# Patient Record
Sex: FEMALE | Race: WHITE | NOT HISPANIC OR LATINO | Employment: OTHER | URBAN - METROPOLITAN AREA
[De-identification: names, ages, dates, MRNs, and addresses within clinical notes are randomized per-mention and may not be internally consistent; named-entity substitution may affect disease eponyms.]

---

## 2017-07-25 ENCOUNTER — HOSPITAL ENCOUNTER (OUTPATIENT)
Dept: MAMMOGRAPHY | Facility: CLINIC | Age: 77
Discharge: HOME | End: 2017-07-25
Attending: OBSTETRICS & GYNECOLOGY | Admitting: OBSTETRICS & GYNECOLOGY
Payer: MEDICARE

## 2017-07-25 DIAGNOSIS — Z12.31 VISIT FOR SCREENING MAMMOGRAM: ICD-10-CM

## 2017-07-25 PROCEDURE — 77063 BREAST TOMOSYNTHESIS BI: CPT

## 2017-07-25 PROCEDURE — G0202 SCR MAMMO BI INCL CAD: HCPCS

## 2019-08-15 ENCOUNTER — HOSPITAL ENCOUNTER (OUTPATIENT)
Dept: MAMMOGRAPHY | Facility: CLINIC | Age: 79
Discharge: HOME | End: 2019-08-15
Attending: OBSTETRICS & GYNECOLOGY | Admitting: OBSTETRICS & GYNECOLOGY
Payer: MEDICARE

## 2019-08-15 DIAGNOSIS — Z12.31 VISIT FOR SCREENING MAMMOGRAM: ICD-10-CM

## 2019-08-15 PROCEDURE — 77063 BREAST TOMOSYNTHESIS BI: CPT

## 2021-04-29 LAB
ALT SERPL-CCNC: 23 U/L (ref 14–59)
AST SERPL-CCNC: 18 U/L (ref 15–37)
CREATININE (EXTERNAL): 0.9 MG/DL (ref 0.55–1.02)
GFR ESTIMATED (EXTERNAL): 61 ML/MIN/1.73_M2
GLUCOSE (EXTERNAL): 87 MG/DL (ref 70–100)
POTASSIUM (EXTERNAL): 3.5 MMOL/L (ref 3.6–5.2)

## 2021-05-06 LAB
ALT SERPL-CCNC: 23 U/L (ref 14–59)
AST SERPL-CCNC: 18 U/L (ref 15–37)
CREATININE (EXTERNAL): 0.83 MG/DL (ref 0.55–1.02)
GFR ESTIMATED (EXTERNAL): 68 ML/MIN/1.73_M2
GLUCOSE (EXTERNAL): 121 MG/DL (ref 70–100)
POTASSIUM (EXTERNAL): 3.6 MMOL/L (ref 3.6–5.2)

## 2021-06-03 LAB
ALT SERPL-CCNC: 24 U/L (ref 14–59)
AST SERPL-CCNC: 16 U/L (ref 15–37)
CREATININE (EXTERNAL): 0.83 MG/DL (ref 0.55–1.02)
GFR ESTIMATED (EXTERNAL): 68 ML/MIN/1.73_M2
GLUCOSE (EXTERNAL): 105 MG/DL (ref 70–100)
POTASSIUM (EXTERNAL): 3.4 MMOL/L (ref 3.6–5.2)

## 2021-06-29 ENCOUNTER — TRANSFERRED RECORDS (OUTPATIENT)
Dept: HEALTH INFORMATION MANAGEMENT | Facility: CLINIC | Age: 81
End: 2021-06-29
Payer: MEDICARE

## 2021-06-29 LAB
ALT SERPL-CCNC: 25 U/L (ref 14–59)
AST SERPL-CCNC: 16 U/L (ref 15–37)
CREATININE (EXTERNAL): 0.91 MG/DL (ref 0.55–1.02)
GFR ESTIMATED (EXTERNAL): 60 ML/MIN/1.73_M2
GLUCOSE (EXTERNAL): 100 MG/DL (ref 70–100)
POTASSIUM (EXTERNAL): 3.7 MMOL/L (ref 3.6–5.2)

## 2021-07-08 ENCOUNTER — TRANSFERRED RECORDS (OUTPATIENT)
Dept: HEALTH INFORMATION MANAGEMENT | Facility: CLINIC | Age: 81
End: 2021-07-08
Payer: MEDICARE

## 2021-07-08 LAB
ALT SERPL-CCNC: 24 U/L (ref 14–59)
AST SERPL-CCNC: 16 U/L (ref 15–37)
CREATININE (EXTERNAL): 0.78 MG/DL (ref 0.55–1.02)
GFR ESTIMATED (EXTERNAL): 73 ML/MIN/1.73_M2
GLUCOSE (EXTERNAL): 113 MG/DL (ref 70–100)
POTASSIUM (EXTERNAL): 3.8 MMOL/L (ref 3.6–5.2)

## 2021-07-15 ENCOUNTER — HOSPITAL ENCOUNTER (OUTPATIENT)
Dept: MAMMOGRAPHY | Facility: CLINIC | Age: 81
Discharge: HOME | End: 2021-07-15
Attending: FAMILY MEDICINE | Admitting: FAMILY MEDICINE
Payer: MEDICARE

## 2021-07-15 DIAGNOSIS — Z12.31 VISIT FOR SCREENING MAMMOGRAM: ICD-10-CM

## 2021-07-15 PROCEDURE — 77063 BREAST TOMOSYNTHESIS BI: CPT

## 2021-08-05 LAB
ALT SERPL-CCNC: 23 U/L (ref 14–59)
AST SERPL-CCNC: 17 U/L (ref 15–37)
CREATININE (EXTERNAL): 0.86 MG/DL (ref 0.55–1.02)
GFR ESTIMATED (EXTERNAL): 65 ML/MIN/1.73_M2
GLUCOSE (EXTERNAL): 81 MG/DL (ref 70–100)
POTASSIUM (EXTERNAL): 4 MMOL/L (ref 3.6–5.2)

## 2021-09-02 LAB
ALT SERPL-CCNC: 29 U/L (ref 14–59)
AST SERPL-CCNC: 18 U/L (ref 15–37)
CREATININE (EXTERNAL): 0.75 MG/DL (ref 0.55–1.02)
GFR ESTIMATED (EXTERNAL): 76 ML/MIN/1.73_M2
GLUCOSE (EXTERNAL): 99 MG/DL (ref 70–100)
POTASSIUM (EXTERNAL): 3.7 MMOL/L (ref 3.6–5.2)

## 2021-09-30 ENCOUNTER — TRANSFERRED RECORDS (OUTPATIENT)
Dept: HEALTH INFORMATION MANAGEMENT | Facility: CLINIC | Age: 81
End: 2021-09-30
Payer: MEDICARE

## 2021-10-01 ENCOUNTER — TRANSFERRED RECORDS (OUTPATIENT)
Dept: HEALTH INFORMATION MANAGEMENT | Facility: CLINIC | Age: 81
End: 2021-10-01
Payer: MEDICARE

## 2021-10-01 LAB
ALT SERPL-CCNC: 22 U/L (ref 14–59)
AST SERPL-CCNC: 16 U/L (ref 15–37)
CREATININE (EXTERNAL): 0.77 MG/DL (ref 0.55–1.02)
GFR SERPL CREATININE-BSD FRML MDRD: 74 ML/MIN/1.73_M2
GLUCOSE (EXTERNAL): 96 MG/DL (ref 70–100)
POTASSIUM (EXTERNAL): 3.8 MMOL/L (ref 3.6–5.2)

## 2022-04-29 ENCOUNTER — TRANSFERRED RECORDS (OUTPATIENT)
Dept: HEALTH INFORMATION MANAGEMENT | Facility: CLINIC | Age: 82
End: 2022-04-29
Payer: MEDICARE

## 2022-04-29 LAB
ALT SERPL-CCNC: 12 U/L (ref 5–33)
AST SERPL-CCNC: 13 U/L (ref 5–32)
CREATININE (EXTERNAL): 0.7 MG/DL (ref 0.51–0.95)
GFR ESTIMATED (EXTERNAL): 82 ML/MIN/1.73_M2
GLUCOSE (EXTERNAL): 113 MG/DL (ref 82–115)
POTASSIUM (EXTERNAL): 3.6 MMOL/L (ref 3.4–5.1)

## 2022-06-16 ENCOUNTER — OFFICE VISIT (OUTPATIENT)
Dept: CARDIOLOGY | Facility: CLINIC | Age: 82
End: 2022-06-16
Payer: MEDICARE

## 2022-06-16 VITALS
WEIGHT: 136.9 LBS | HEART RATE: 80 BPM | BODY MASS INDEX: 23.37 KG/M2 | OXYGEN SATURATION: 99 % | HEIGHT: 64 IN | SYSTOLIC BLOOD PRESSURE: 138 MMHG | DIASTOLIC BLOOD PRESSURE: 60 MMHG

## 2022-06-16 DIAGNOSIS — R01.1 HEART MURMUR: ICD-10-CM

## 2022-06-16 DIAGNOSIS — I10 HYPERTENSION, UNSPECIFIED TYPE: ICD-10-CM

## 2022-06-16 DIAGNOSIS — E78.5 HYPERLIPIDEMIA LDL GOAL <100: ICD-10-CM

## 2022-06-16 DIAGNOSIS — Z82.49 FAMILY HISTORY OF ISCHEMIC HEART DISEASE: ICD-10-CM

## 2022-06-16 DIAGNOSIS — Z13.6 CARDIOVASCULAR SCREENING; LDL GOAL LESS THAN 100: Primary | ICD-10-CM

## 2022-06-16 DIAGNOSIS — R09.89 BILATERAL CAROTID BRUITS: ICD-10-CM

## 2022-06-16 PROCEDURE — 99204 OFFICE O/P NEW MOD 45 MIN: CPT | Performed by: INTERNAL MEDICINE

## 2022-06-16 PROCEDURE — 93000 ELECTROCARDIOGRAM COMPLETE: CPT | Performed by: INTERNAL MEDICINE

## 2022-06-16 RX ORDER — ENALAPRIL MALEATE 20 MG/1
20 TABLET ORAL 2 TIMES DAILY
COMMUNITY
Start: 2022-04-03

## 2022-06-16 RX ORDER — AMLODIPINE BESYLATE 10 MG/1
10 TABLET ORAL DAILY
COMMUNITY
Start: 2022-04-03

## 2022-06-16 RX ORDER — MELOXICAM 7.5 MG/1
7.5 TABLET ORAL DAILY
COMMUNITY
Start: 2022-05-20

## 2022-06-16 RX ORDER — ATORVASTATIN CALCIUM 10 MG/1
10 TABLET, FILM COATED ORAL DAILY
COMMUNITY
Start: 2022-04-03

## 2022-06-16 NOTE — LETTER
6/16/2022    Jorge Ram MD  [facility] [address]  [facility] [address]    RE: Jaki Veloz       Dear Colleague,     I had the pleasure of seeing Jaki Veloz in the [facility].  HPI and Plan:   See dictation    Orders Placed This Encounter   Procedures     US Carotid Bilateral     Follow-Up with Cardiology     EKG 12-lead complete w/read - Clinics (performed today)     Echocardiogram Complete       Orders Placed This Encounter   Medications     enalapril (VASOTEC) 20 MG tablet     Sig: Take 20 mg by mouth 2 times daily     amLODIPine (NORVASC) 10 MG tablet     Sig: Take 10 mg by mouth daily     atorvastatin (LIPITOR) 10 MG tablet     Sig: Take 10 mg by mouth daily     meloxicam (MOBIC) 7.5 MG tablet     Sig: Take 7.5 mg by mouth daily     Multiple Vitamins-Minerals (CENTRUM SILVER 50+WOMEN PO)     Calcium Carb-Cholecalciferol (CALCIUM 500 + D) 500-125 MG-UNIT TABS     Coenzyme Q10 (CO Q 10) 100 MG CAPS       There are no discontinued medications.      Encounter Diagnoses   Name Primary?     CARDIOVASCULAR SCREENING; LDL GOAL LESS THAN 100 Yes     Hypertension, unspecified type      Hyperlipidemia LDL goal <100      Family history of ischemic heart disease      Bilateral carotid bruits      Heart murmur        CURRENT MEDICATIONS:  Current Outpatient Medications   Medication Sig Dispense Refill     amLODIPine (NORVASC) 10 MG tablet Take 10 mg by mouth daily       atorvastatin (LIPITOR) 10 MG tablet Take 10 mg by mouth daily       Calcium Carb-Cholecalciferol (CALCIUM 500 + D) 500-125 MG-UNIT TABS        Coenzyme Q10 (CO Q 10) 100 MG CAPS        enalapril (VASOTEC) 20 MG tablet Take 20 mg by mouth 2 times daily       meloxicam (MOBIC) 7.5 MG tablet Take 7.5 mg by mouth daily       Multiple Vitamins-Minerals (CENTRUM SILVER 50+WOMEN PO)          ALLERGIES     Allergies   Allergen Reactions     Penicillins        PAST MEDICAL HISTORY:  History reviewed. No pertinent past  "medical history.    PAST SURGICAL HISTORY:  History reviewed. No pertinent surgical history.    FAMILY HISTORY:  History reviewed. No pertinent family history.    SOCIAL HISTORY:  Social History     Socioeconomic History     Marital status:      Spouse name: None     Number of children: None     Years of education: None     Highest education level: None   Tobacco Use     Smoking status: Never Smoker     Smokeless tobacco: Never Used   Substance and Sexual Activity     Drug use: Never       Review of Systems:  Skin:  Negative       Eyes:  Negative      ENT:  Negative      Respiratory:  Negative       Cardiovascular:  Negative      Gastroenterology: Negative      Genitourinary:  Negative      Musculoskeletal:  Negative      Neurologic:         Psychiatric:  Negative      Heme/Lymph/Imm:  Negative      Endocrine:  Negative        Physical Exam:  Vitals: /60 (BP Location: Right arm, Patient Position: Sitting, Cuff Size: Adult Regular)   Pulse 80   Ht 1.626 m (5' 4\")   Wt 62.1 kg (136 lb 14.4 oz)   LMP  (LMP Unknown)   SpO2 99%   Breastfeeding No   BMI 23.50 kg/m      Constitutional:  cooperative;in no acute distress        Skin:  warm and dry to the touch          Head:  normocephalic        Eyes:  pupils equal and round        Lymph:      ENT:  no pallor or cyanosis        Neck:    bilateral carotid bruit      Respiratory:  clear to auscultation;normal symmetry         Cardiac: regular rhythm       systolic ejection murmur;RUSB        pulses full and equal                                        GI:  abdomen soft;no bruits        Extremities and Muscular Skeletal:  no deformities, clubbing, cyanosis, erythema observed;no edema              Neurological:  no gross motor deficits;affect appropriate        Psych:  Alert and Oriented x 3          CC  Referred Self,    Thank you for allowing me to participate in the care of your patient.      Sincerely,     Fabiola Baxter, [facility] " [facility]

## 2022-06-16 NOTE — PROGRESS NOTES
HPI and Plan:   See dictation    Orders Placed This Encounter   Procedures     US Carotid Bilateral     Follow-Up with Cardiology     EKG 12-lead complete w/read - Clinics (performed today)     Echocardiogram Complete       Orders Placed This Encounter   Medications     enalapril (VASOTEC) 20 MG tablet     Sig: Take 20 mg by mouth 2 times daily     amLODIPine (NORVASC) 10 MG tablet     Sig: Take 10 mg by mouth daily     atorvastatin (LIPITOR) 10 MG tablet     Sig: Take 10 mg by mouth daily     meloxicam (MOBIC) 7.5 MG tablet     Sig: Take 7.5 mg by mouth daily     Multiple Vitamins-Minerals (CENTRUM SILVER 50+WOMEN PO)     Calcium Carb-Cholecalciferol (CALCIUM 500 + D) 500-125 MG-UNIT TABS     Coenzyme Q10 (CO Q 10) 100 MG CAPS       There are no discontinued medications.      Encounter Diagnoses   Name Primary?     CARDIOVASCULAR SCREENING; LDL GOAL LESS THAN 100 Yes     Hypertension, unspecified type      Hyperlipidemia LDL goal <100      Family history of ischemic heart disease      Bilateral carotid bruits      Heart murmur        CURRENT MEDICATIONS:  Current Outpatient Medications   Medication Sig Dispense Refill     amLODIPine (NORVASC) 10 MG tablet Take 10 mg by mouth daily       atorvastatin (LIPITOR) 10 MG tablet Take 10 mg by mouth daily       Calcium Carb-Cholecalciferol (CALCIUM 500 + D) 500-125 MG-UNIT TABS        Coenzyme Q10 (CO Q 10) 100 MG CAPS        enalapril (VASOTEC) 20 MG tablet Take 20 mg by mouth 2 times daily       meloxicam (MOBIC) 7.5 MG tablet Take 7.5 mg by mouth daily       Multiple Vitamins-Minerals (CENTRUM SILVER 50+WOMEN PO)          ALLERGIES     Allergies   Allergen Reactions     Penicillins        PAST MEDICAL HISTORY:  History reviewed. No pertinent past medical history.    PAST SURGICAL HISTORY:  History reviewed. No pertinent surgical history.    FAMILY HISTORY:  History reviewed. No pertinent family history.    SOCIAL HISTORY:  Social History     Socioeconomic History      "Marital status:      Spouse name: None     Number of children: None     Years of education: None     Highest education level: None   Tobacco Use     Smoking status: Never Smoker     Smokeless tobacco: Never Used   Substance and Sexual Activity     Drug use: Never       Review of Systems:  Skin:  Negative       Eyes:  Negative      ENT:  Negative      Respiratory:  Negative       Cardiovascular:  Negative      Gastroenterology: Negative      Genitourinary:  Negative      Musculoskeletal:  Negative      Neurologic:         Psychiatric:  Negative      Heme/Lymph/Imm:  Negative      Endocrine:  Negative        Physical Exam:  Vitals: /60 (BP Location: Right arm, Patient Position: Sitting, Cuff Size: Adult Regular)   Pulse 80   Ht 1.626 m (5' 4\")   Wt 62.1 kg (136 lb 14.4 oz)   LMP  (LMP Unknown)   SpO2 99%   Breastfeeding No   BMI 23.50 kg/m      Constitutional:  cooperative;in no acute distress        Skin:  warm and dry to the touch          Head:  normocephalic        Eyes:  pupils equal and round        Lymph:      ENT:  no pallor or cyanosis        Neck:    bilateral carotid bruit      Respiratory:  clear to auscultation;normal symmetry         Cardiac: regular rhythm       systolic ejection murmur;RUSB        pulses full and equal                                        GI:  abdomen soft;no bruits        Extremities and Muscular Skeletal:  no deformities, clubbing, cyanosis, erythema observed;no edema              Neurological:  no gross motor deficits;affect appropriate        Psych:  Alert and Oriented x 3          CC  Referred Self, MD  No address on file                  "

## 2022-06-16 NOTE — PROGRESS NOTES
Service Date: 06/16/2022    REFERRING PHYSICIAN:  Jorge Ram MD    HISTORY OF PRESENT ILLNESS:  The patient is referred for preventative cardiac cares.  She is 81 years old and has a family history of heart disease, so she wanted to come in to be proactive about any possible future heart related issues.  She is not having any symptoms, specifically denies any chest pain or difficulty breathing.  She is a very healthy, vivacious, active 81-year-old.  She actually lives in [place], [place] but spends quite a bit of the summer up here in [place] with her family.  She is on blood pressure medication and cholesterol lowering medication.  She takes meloxicam for arthritis.  We did perform an electrocardiogram in office today, which I reviewed.  This shows a normal sinus rhythm, normal axis.  She has just a slight T-wave abnormality in V2, but otherwise, this is a normal EKG.  I do not have one available for comparison.    PHYSICAL EXAMINATION:  Her blood pressure today was 138/60, pulse of 80, weight is 136 with a body mass index of 23.  She has bilateral carotid bruits, right greater than left.  Cardiovascular tones are regular suggesting a normal sinus rhythm but with a loud systolic ejection murmur best heard at the right upper sternal border.  Her lung fields are clear.  She has strong pulses in the distal extremities without peripheral edema.    In summary, Ms. Veloz is a very pleasant 81-year-old female with underlying history of hypertension, hyperlipidemia and family history of coronary disease.  She is presenting for preventative cares with concerns about her risk for future cardiovascular events.  On exam, I have diagnosed her with a heart murmur and bilateral carotid artery bruits.  This could be radiation as the murmur does suggest aortic stenosis.  However, she does have risk factors for peripheral arterial disease, so I will recommend that she undergo both an echocardiogram to assess the heart  murmur and carotid ultrasound.  She is agreeable to this.  As far as determining her risk for future cardiovascular events, given that she is asymptomatic, I felt a CT calcium score may be helpful.  I explained this test to her in detail, also that it likely will not be covered by insurance, and she is agreeable to proceed with this as well, so I ordered a CT calcium score.  She is currently living in [place], and therefore, we will try to schedule all these tests at the same time followed by a followup visit so we can go over the test results.    Please feel free to contact me with any questions you have in regards to her care.      Fabiola Baxter DO        D: 2022   T: 2022   MT: shantel    Name:     LUCI WHEELER  MRN:      [id number]        Account:      [id number]   :      1940           Service Date: 2022       Document: [id number]

## 2022-07-08 ENCOUNTER — TELEPHONE (OUTPATIENT)
Dept: CARDIOLOGY | Facility: CLINIC | Age: 82
End: 2022-07-08

## 2022-07-08 ENCOUNTER — HOSPITAL ENCOUNTER (OUTPATIENT)
Dept: CARDIOLOGY | Facility: CLINIC | Age: 82
Discharge: HOME | End: 2022-07-08
Attending: INTERNAL MEDICINE
Payer: MEDICARE

## 2022-07-08 ENCOUNTER — HOSPITAL ENCOUNTER (OUTPATIENT)
Dept: CARDIOLOGY | Facility: CLINIC | Age: 82
Discharge: HOME | End: 2022-07-08
Attending: INTERNAL MEDICINE | Admitting: INTERNAL MEDICINE

## 2022-07-08 ENCOUNTER — HOSPITAL ENCOUNTER (OUTPATIENT)
Dept: ULTRASOUND IMAGING | Facility: CLINIC | Age: 82
Discharge: HOME | End: 2022-07-08
Attending: INTERNAL MEDICINE
Payer: MEDICARE

## 2022-07-08 DIAGNOSIS — I10 HYPERTENSION, UNSPECIFIED TYPE: ICD-10-CM

## 2022-07-08 DIAGNOSIS — E78.5 HYPERLIPIDEMIA LDL GOAL <100: ICD-10-CM

## 2022-07-08 DIAGNOSIS — Z82.49 FAMILY HISTORY OF ISCHEMIC HEART DISEASE: ICD-10-CM

## 2022-07-08 DIAGNOSIS — R09.89 BILATERAL CAROTID BRUITS: ICD-10-CM

## 2022-07-08 DIAGNOSIS — Z13.6 CARDIOVASCULAR SCREENING; LDL GOAL LESS THAN 100: ICD-10-CM

## 2022-07-08 DIAGNOSIS — R01.1 HEART MURMUR: ICD-10-CM

## 2022-07-08 DIAGNOSIS — R94.30 ABNORMAL CARDIAC FUNCTION TEST: Primary | ICD-10-CM

## 2022-07-08 LAB
BI-PLANE LVEF ECHO: NORMAL
LVEF ECHO: NORMAL

## 2022-07-08 PROCEDURE — 75571 CT HRT W/O DYE W/CA TEST: CPT

## 2022-07-08 PROCEDURE — 93306 TTE W/DOPPLER COMPLETE: CPT | Mod: 26 | Performed by: INTERNAL MEDICINE

## 2022-07-08 PROCEDURE — 93880 EXTRACRANIAL BILAT STUDY: CPT

## 2022-07-08 PROCEDURE — 93306 TTE W/DOPPLER COMPLETE: CPT

## 2022-07-08 PROCEDURE — 93880 EXTRACRANIAL BILAT STUDY: CPT | Mod: 26 | Performed by: INTERNAL MEDICINE

## 2022-07-08 PROCEDURE — 75571 CT HRT W/O DYE W/CA TEST: CPT | Mod: 26 | Performed by: INTERNAL MEDICINE

## 2022-07-08 NOTE — TELEPHONE ENCOUNTER
Results noted. Dr. Baxter out of the office. RN will send to her partner to review and advise if ok to wait for her return or if sooner recommendations.                 7/8/22 CT coronary calcium results  CORONARY ARTERY CALCIUM SCORES:      Left main coronary artery: 0  Left anterior descending coronary artery: 621  Circumflex coronary artery: 28.8  Right coronary artery: 116     TOTAL CALCIUM SCORE: 766         6/16/22 visit Dr. Baxter  In summary, Ms. Veloz is a very pleasant 81-year-old female with underlying history of hypertension, hyperlipidemia and family history of coronary disease.  She is presenting for preventative cares with concerns about her risk for future cardiovascular events.  On exam, I have diagnosed her with a heart murmur and bilateral carotid artery bruits.  This could be radiation as the murmur does suggest aortic stenosis.  However, she does have risk factors for peripheral arterial disease, so I will recommend that she undergo both an echocardiogram to assess the heart murmur and carotid ultrasound.  She is agreeable to this.  As far as determining her risk for future cardiovascular events, given that she is asymptomatic, I felt a CT calcium score may be helpful.  I explained this test to her in detail, also that it likely will not be covered by insurance, and she is agreeable to proceed with this as well, so I ordered a CT calcium score.  She is currently living in [place], and therefore, we will try to schedule all these tests at the same time followed by a followup visit so we can go over the test results.     Please feel free to contact me with any questions you have in regards to her care.        Fabiola Baxter, DO

## 2022-07-08 NOTE — TELEPHONE ENCOUNTER
Cyrus    It looks like she is asymptomatic per Dr. Baxter's note. I would wait for her to review this, since some of us would refer for stress testing but others may not. She can also go over the medication recommendations at that time.    Thanks.

## 2022-07-15 NOTE — TELEPHONE ENCOUNTER
RN called patient and reviewed with her Dr. Baxter's recommendations. Patient verbalized understanding and is in agreement with plan. RN transferred patient to scheduling to arrange labs and stress test.       I would order an exercise TM stress test and lipid profile with alt      Dr. Baxter

## 2022-07-28 ENCOUNTER — HOSPITAL ENCOUNTER (OUTPATIENT)
Dept: CARDIOLOGY | Facility: CLINIC | Age: 82
Discharge: HOME | End: 2022-07-28
Attending: INTERNAL MEDICINE | Admitting: INTERNAL MEDICINE
Payer: MEDICARE

## 2022-07-28 ENCOUNTER — LAB (OUTPATIENT)
Dept: LAB | Facility: CLINIC | Age: 82
End: 2022-07-28
Payer: MEDICARE

## 2022-07-28 DIAGNOSIS — E78.5 HYPERLIPIDEMIA LDL GOAL <100: ICD-10-CM

## 2022-07-28 DIAGNOSIS — R94.30 ABNORMAL CARDIAC FUNCTION TEST: ICD-10-CM

## 2022-07-28 LAB
ALT SERPL W P-5'-P-CCNC: 23 U/L (ref 0–50)
CHOLEST SERPL-MCNC: 157 MG/DL
FASTING STATUS PATIENT QL REPORTED: YES
HDLC SERPL-MCNC: 67 MG/DL
LDLC SERPL CALC-MCNC: 71 MG/DL
NONHDLC SERPL-MCNC: 90 MG/DL
TRIGL SERPL-MCNC: 93 MG/DL

## 2022-07-28 PROCEDURE — 93016 CV STRESS TEST SUPVJ ONLY: CPT | Performed by: INTERNAL MEDICINE

## 2022-07-28 PROCEDURE — 84460 ALANINE AMINO (ALT) (SGPT): CPT | Performed by: INTERNAL MEDICINE

## 2022-07-28 PROCEDURE — 80061 LIPID PANEL: CPT | Performed by: INTERNAL MEDICINE

## 2022-07-28 PROCEDURE — 36415 COLL VENOUS BLD VENIPUNCTURE: CPT | Performed by: INTERNAL MEDICINE

## 2022-07-28 PROCEDURE — 93017 CV STRESS TEST TRACING ONLY: CPT

## 2022-07-28 PROCEDURE — 93018 CV STRESS TEST I&R ONLY: CPT | Performed by: INTERNAL MEDICINE

## 2022-07-31 ENCOUNTER — HEALTH MAINTENANCE LETTER (OUTPATIENT)
Age: 82
End: 2022-07-31

## 2022-08-11 ENCOUNTER — HOSPITAL ENCOUNTER (OUTPATIENT)
Dept: MAMMOGRAPHY | Facility: CLINIC | Age: 82
Discharge: HOME | End: 2022-08-11
Attending: FAMILY MEDICINE | Admitting: FAMILY MEDICINE
Payer: MEDICARE

## 2022-08-11 DIAGNOSIS — Z12.31 VISIT FOR SCREENING MAMMOGRAM: ICD-10-CM

## 2022-08-11 PROCEDURE — 77067 SCR MAMMO BI INCL CAD: CPT

## 2022-08-18 ENCOUNTER — OFFICE VISIT (OUTPATIENT)
Dept: CARDIOLOGY | Facility: CLINIC | Age: 82
End: 2022-08-18
Attending: INTERNAL MEDICINE
Payer: MEDICARE

## 2022-08-18 VITALS
SYSTOLIC BLOOD PRESSURE: 157 MMHG | WEIGHT: 138 LBS | DIASTOLIC BLOOD PRESSURE: 62 MMHG | OXYGEN SATURATION: 98 % | HEIGHT: 64 IN | HEART RATE: 83 BPM | BODY MASS INDEX: 23.56 KG/M2

## 2022-08-18 DIAGNOSIS — E78.5 HYPERLIPIDEMIA LDL GOAL <100: ICD-10-CM

## 2022-08-18 DIAGNOSIS — R01.1 HEART MURMUR: ICD-10-CM

## 2022-08-18 DIAGNOSIS — I10 HYPERTENSION, UNSPECIFIED TYPE: ICD-10-CM

## 2022-08-18 DIAGNOSIS — R93.1 ELEVATED CORONARY ARTERY CALCIUM SCORE: ICD-10-CM

## 2022-08-18 PROCEDURE — 99214 OFFICE O/P EST MOD 30 MIN: CPT | Performed by: NURSE PRACTITIONER

## 2022-08-18 NOTE — PROGRESS NOTES
Cardiology Clinic Progress Note  Jaki Veloz MRN# [id number]   YOB: 1940 Age: 81 year old     Reason For Visit:  Review of diagnostic testing    Primary Cardiologist:   Dr. Baxter           History of Presenting Illness:      Jaki Veloz is a pleasant 81 year old patient who carries a past medical history significant for hypertension, hyperlipidemia, arthritis, ALL, and family history of CAD.    She was last seen on 6/16/2022 to establish care with Dr. Baxter for preventative cardiac care.  Upon exam, she was noted to have a heart murmur and bilateral carotid bruits.  She subsequently underwent cardiac testing consisting of an echocardiogram that showed a low normal ejection fraction estimated at 50 to 55%, normal RV size and function, no significant valvular disease, and a mildly dilated ascending aorta measuring 3.8 cm.  Bilateral carotid ultrasound showed no hemodynamically significant obstructive carotid disease.  CT coronary calcium screening showed a total calcium score of 766 (621 in the LAD, 28.8 in the circumflex, and 116 in the RCA ) placing her in the 88th percentile when compared to age and gender matched control groups.  Exercise stress test was negative for stress-induced ischemia.     She returns to the office today for review of results. She offers no cardiac complaint, denies chest pain, shortness of breath, PND, orthopnea, presyncope, syncope, edema, heart racing, or palpitations.     Blood pressure is elevated at 162/60 with repeat reading reduced to 157/62, which she attributes to whitecoat syndrome.  Home blood pressure readings are well controlled in the 120-130's systolic.   Lipid panel showed total cholesterol 157, HDL 67, LDL 71, triglycerides 93  BMI 23.69    She continues to be very active  Follows a heart healthy diet  Remains compliant with all medications.                   Assessment and Plan:     1. Elevated coronary calcium score / CAD -  CT coronary calcium  screening showed a total calcium score of 766 (621 in the LAD, 28.8 in the circumflex, and 116 in the RCA ) placing her in the 88th percentile when compared to age and gender matched control groups.  Follow up exercise stress test, negative for stress- induced ischemia.  For primary prevention I will add low-dose aspirin 81 mg daily.  Continue on statin.    2. Hypertension -elevated today which she attributes to whitecoat syndrome.  Home pressures are well controlled.  Continue to monitor daily with a goal of less than 140/90.  Continue amlodipine and enalapril.  Strict low-sodium diet.    3. Hyperlipidemia - Lipid panel showed total cholesterol 157, HDL 67, LDL 71, triglycerides 93, continue statin.                Thank you for allowing me to participate in this delightful patient's care. I have recommended she follow up in 1 year with Dr. Baxter .       Nae Hernandez, SERINA CNP         Review of Systems:     Review of Systems:  Skin:  not assessed     Eyes:  not assessed    ENT:  not assessed    Respiratory:  Negative    Cardiovascular:  Negative    Gastroenterology: not assessed    Genitourinary:  not assessed    Musculoskeletal:  not assessed    Neurologic:  not assessed    Psychiatric:  not assessed    Heme/Lymph/Imm:  not assessed    Endocrine:  not assessed                Physical Exam:     GEN:  In general, this is a well nourished elderly female in no acute distress.  HEENT: Pupils are round/reactive. Sclerae nonicteric. Clear oropharynx. Mucous membranes moist.  NECK: Supple, no masses appreciated. Trachea midline. No JVD   C/V:  Regular rate and rhythm, systolic murmur, no rub or gallop. No S3 or RV heave.   RESP: Respirations are unlabored. No use of accessory muscles. Clear to auscultation bilaterally without wheezing, rales, or rhonchi.  GI: Abdomen soft, nontender, nondistended. No HSM appreciated.   EXTREM: No LE edema. No cyanosis or clubbing.  NEURO: Alert and oriented, cooperative. No obvious  focal deficits.   PSYCH: Normal affect.  SKIN: Warm and dry. No rashes or petechiae appreciated.          Past Medical History:   No past medical history on file.           Past Surgical History:   No past surgical history on file.           Allergies:   Penicillins       Data:   All laboratory data reviewed:    LAST CHOLESTEROL:  Lab Results   Component Value Date    CHOL 157 07/28/2022     Lab Results   Component Value Date    HDL 67 07/28/2022     Lab Results   Component Value Date    LDL 71 07/28/2022     Lab Results   Component Value Date    TRIG 93 07/28/2022

## 2022-08-18 NOTE — PATIENT INSTRUCTIONS
Thanks for participating in a office visit with the [facility] today.    Reviewed results of echocardiogram, stress test and carotid US - Stable.   Coronary calcium score is elevated. Recommend low dose aspirin 81 mg daily.  Continue statin.   Blood pressure mildly elevated today, well controlled at home  Continue current medical therapy   Encourage exercise and heart healthy diet     Follow up in 1 year with Dr. Baxter or ERIC     Please call my nurse at  [phone] with any questions or concerns.    Scheduling phone number: [phone]  Reminder: Please bring in all current medications, over the counter supplements and vitamin bottles to your next appointment.

## 2022-08-18 NOTE — LETTER
8/18/2022    Jorge Ram MD  [facility] [address]  [facility] [address]    RE: Jaki Veloz       Dear Colleague,     I had the pleasure of seeing Jaki Veloz in the [facility].  Cardiology Clinic Progress Note  Jaki Veloz MRN# [id number]   YOB: 1940 Age: 81 year old     Reason For Visit:  Review of diagnostic testing    Primary Cardiologist:   Dr. Baxter           History of Presenting Illness:      Jaki Veloz is a pleasant 81 year old patient who carries a past medical history significant for hypertension, hyperlipidemia, arthritis, ALL, and family history of CAD.    She was last seen on 6/16/2022 to establish care with Dr. Baxter for preventative cardiac care.  Upon exam, she was noted to have a heart murmur and bilateral carotid bruits.  She subsequently underwent cardiac testing consisting of an echocardiogram that showed a low normal ejection fraction estimated at 50 to 55%, normal RV size and function, no significant valvular disease, and a mildly dilated ascending aorta measuring 3.8 cm.  Bilateral carotid ultrasound showed no hemodynamically significant obstructive carotid disease.  CT coronary calcium screening showed a total calcium score of 766 (621 in the LAD, 28.8 in the circumflex, and 116 in the RCA ) placing her in the 88th percentile when compared to age and gender matched control groups.  Exercise stress test was negative for stress-induced ischemia.     She returns to the office today for review of results. She offers no cardiac complaint, denies chest pain, shortness of breath, PND, orthopnea, presyncope, syncope, edema, heart racing, or palpitations.     Blood pressure is elevated at 162/60 with repeat reading reduced to 157/62, which she attributes to whitecoat syndrome.  Home blood pressure readings are well controlled in the 120-130's systolic.   Lipid panel showed total cholesterol 157, HDL 67, LDL 71, triglycerides 93  BMI  23.69    She continues to be very active  Follows a heart healthy diet  Remains compliant with all medications.                   Assessment and Plan:     1. Elevated coronary calcium score / CAD -  CT coronary calcium screening showed a total calcium score of 766 (621 in the LAD, 28.8 in the circumflex, and 116 in the RCA ) placing her in the 88th percentile when compared to age and gender matched control groups.  Follow up exercise stress test, negative for stress- induced ischemia.  For primary prevention I will add low-dose aspirin 81 mg daily.  Continue on statin.    2. Hypertension -elevated today which she attributes to whitecoat syndrome.  Home pressures are well controlled.  Continue to monitor daily with a goal of less than 140/90.  Continue amlodipine and enalapril.  Strict low-sodium diet.    3. Hyperlipidemia - Lipid panel showed total cholesterol 157, HDL 67, LDL 71, triglycerides 93, continue statin.                Thank you for allowing me to participate in this delightful patient's care. I have recommended she follow up in 1 year with Dr. Baxter .       Nae Hernandez, APRN CNP         Review of Systems:     Review of Systems:  Skin:  not assessed     Eyes:  not assessed    ENT:  not assessed    Respiratory:  Negative    Cardiovascular:  Negative    Gastroenterology: not assessed    Genitourinary:  not assessed    Musculoskeletal:  not assessed    Neurologic:  not assessed    Psychiatric:  not assessed    Heme/Lymph/Imm:  not assessed    Endocrine:  not assessed                Physical Exam:     GEN:  In general, this is a well nourished elderly female in no acute distress.  HEENT: Pupils are round/reactive. Sclerae nonicteric. Clear oropharynx. Mucous membranes moist.  NECK: Supple, no masses appreciated. Trachea midline. No JVD   C/V:  Regular rate and rhythm, systolic murmur, no rub or gallop. No S3 or RV heave.   RESP: Respirations are unlabored. No use of accessory muscles. Clear to  auscultation bilaterally without wheezing, rales, or rhonchi.  GI: Abdomen soft, nontender, nondistended. No HSM appreciated.   EXTREM: No LE edema. No cyanosis or clubbing.  NEURO: Alert and oriented, cooperative. No obvious focal deficits.   PSYCH: Normal affect.  SKIN: Warm and dry. No rashes or petechiae appreciated.          Past Medical History:   No past medical history on file.           Past Surgical History:   No past surgical history on file.           Allergies:   Penicillins       Data:   All laboratory data reviewed:    LAST CHOLESTEROL:  Lab Results   Component Value Date    CHOL 157 07/28/2022     Lab Results   Component Value Date    HDL 67 07/28/2022     Lab Results   Component Value Date    LDL 71 07/28/2022     Lab Results   Component Value Date    TRIG 93 07/28/2022     Thank you for allowing me to participate in the care of your patient.      Sincerely,     SERINA Mendes [facility]    cc:   Fabiola Baxter DO  [address]  [place] [address]

## 2022-10-15 ENCOUNTER — HEALTH MAINTENANCE LETTER (OUTPATIENT)
Age: 82
End: 2022-10-15

## 2022-10-19 ENCOUNTER — TELEPHONE (OUTPATIENT)
Dept: CARDIOLOGY | Facility: CLINIC | Age: 82
End: 2022-10-19

## 2022-10-19 NOTE — TELEPHONE ENCOUNTER
Called patient to review recent elevated blood pressure reading.  Per [place] Community Me[place] guidelines, patients blood pressure is out of parameters and recheck blood pressure is recommended.    Last Blood Pressure: 157/60  Last Heart Rate:   Date: 08/18/22  Location: [facility]    Today's Blood Pressure: 127/80  Today's Heart Rate: 66  Location: Home BP    Patient reported blood pressure updated in Epic. Blood pressure falls within [place] Community Measures guidelines.  Patient will follow up as previously advised.     10/19/22 @ 1:22 ALHAJI Kong CMA

## 2023-08-18 ENCOUNTER — HOSPITAL ENCOUNTER (OUTPATIENT)
Dept: MAMMOGRAPHY | Facility: CLINIC | Age: 83
Discharge: HOME | End: 2023-08-18
Attending: FAMILY MEDICINE | Admitting: FAMILY MEDICINE
Payer: MEDICARE

## 2023-08-18 DIAGNOSIS — Z12.31 VISIT FOR SCREENING MAMMOGRAM: ICD-10-CM

## 2023-08-18 PROCEDURE — 77067 SCR MAMMO BI INCL CAD: CPT

## 2023-08-20 ENCOUNTER — HEALTH MAINTENANCE LETTER (OUTPATIENT)
Age: 83
End: 2023-08-20

## 2024-09-13 ENCOUNTER — HOSPITAL ENCOUNTER (OUTPATIENT)
Dept: MAMMOGRAPHY | Facility: CLINIC | Age: 84
Discharge: HOME | End: 2024-09-13
Attending: FAMILY MEDICINE | Admitting: FAMILY MEDICINE
Payer: MEDICARE

## 2024-09-13 DIAGNOSIS — Z12.31 VISIT FOR SCREENING MAMMOGRAM: ICD-10-CM

## 2024-09-13 PROCEDURE — 77063 BREAST TOMOSYNTHESIS BI: CPT

## 2024-10-13 ENCOUNTER — HEALTH MAINTENANCE LETTER (OUTPATIENT)
Age: 84
End: 2024-10-13